# Patient Record
Sex: MALE
[De-identification: names, ages, dates, MRNs, and addresses within clinical notes are randomized per-mention and may not be internally consistent; named-entity substitution may affect disease eponyms.]

---

## 2021-04-08 PROBLEM — Z00.00 ENCOUNTER FOR PREVENTIVE HEALTH EXAMINATION: Status: ACTIVE | Noted: 2021-04-08

## 2021-04-12 ENCOUNTER — APPOINTMENT (OUTPATIENT)
Dept: ORTHOPEDIC SURGERY | Facility: CLINIC | Age: 26
End: 2021-04-12
Payer: COMMERCIAL

## 2021-04-12 VITALS — HEIGHT: 72 IN | WEIGHT: 203 LBS | BODY MASS INDEX: 27.5 KG/M2

## 2021-04-12 VITALS — SYSTOLIC BLOOD PRESSURE: 128 MMHG | HEART RATE: 84 BPM | DIASTOLIC BLOOD PRESSURE: 80 MMHG

## 2021-04-12 DIAGNOSIS — Z78.9 OTHER SPECIFIED HEALTH STATUS: ICD-10-CM

## 2021-04-12 DIAGNOSIS — Z83.3 FAMILY HISTORY OF DIABETES MELLITUS: ICD-10-CM

## 2021-04-12 DIAGNOSIS — M79.604 PAIN IN RIGHT LEG: ICD-10-CM

## 2021-04-12 PROCEDURE — 99203 OFFICE O/P NEW LOW 30 MIN: CPT

## 2021-04-12 PROCEDURE — 73590 X-RAY EXAM OF LOWER LEG: CPT | Mod: RT

## 2021-04-12 PROCEDURE — 99072 ADDL SUPL MATRL&STAF TM PHE: CPT

## 2021-04-14 PROBLEM — M79.604 RIGHT LEG PAIN: Status: ACTIVE | Noted: 2021-04-14

## 2021-04-14 NOTE — PHYSICAL EXAM
[de-identified] : Oriented to time, place, person\par Mood: Normal\par Affect: Normal\par Appearance: Healthy, well appearing, no acute distress.\par Gait: Normal\par Assistive Devices: None\par \par Right Lower Extremity:\par \par Skin: Clean, dry, intact\par Inspection: No obvious malalignment, no masses, no swelling, PES planis deformity. \par Pulses: 2+ DP/PT pulses\par ROM: Full knee ROM, full ankle ROM\par Tenderness: Diffuse ttp to medial tibial border\par Stability: Ankle A/P stable, Knee stable varus/valgus/drawer \par Strength: 5/5 Q/H/TA/GS/EHL, without atrophy\par Neuro: In tact to light touch throughout, DTR's normal [de-identified] : The following radiographs were ordered and read by me during this patients visit. I reviewed each radiograph in detail with the patient and discussed the findings as highlighted below. \par \par 3 views of the right tib/fib were obtained today, 04/12/2021, that show no acute fracture or dislocation. There is no degenerative change seen. There is no gross malalignment. No significant other obvious osseous abnormality, otherwise unremarkable.

## 2021-04-14 NOTE — HISTORY OF PRESENT ILLNESS
[de-identified] : 26 year old male in [employer], hx of flat foot presents today with right lower extremity pain. States that physical activities done at  the [employer] may have cause his lower extremity pain. The pain is constant worse with running and working out. Pain is localized to the shins. He noticed swelling and redness after running a couple of times. He thought it was just shin splints but the pain has become worse. He is not taking pain medication. He reports wearing special soles for his flat foot deformity. \par \par The patient's past medical history, past surgical history, medications and allergies were reviewed by me today with the patient and documented accordingly. In addition, the patient's family and social history, which were noncontributory to this visit, were reviewed also.

## 2021-04-14 NOTE — DISCUSSION/SUMMARY
[de-identified] : 25 y/o male with right shin pain. \par \par The patient presents with an overuse load injury to the right tibia. The patient's symptoms are vague diffuse pain along the right tibia. I discussed the risk factors for this injury includes running on uneven surfaces, poor technique, improper running shoes, increase in activity, and over pronation. The pain is typically caused from a periostitis and inflammatory reaction. The patient's radiographs do not show evidence of stress reaction. Differential diagnosis includes medial tibial stress syndrome, stress fracture, exertional compartment syndrome, tendinopathy, peripheral nerve entrapment, or lumbar radiculopathy. MRI Rx given to patient to further delineate any internal structural derangement to the right tib/fib. This will allow for better understanding of the severity of disease, and allow for better stratification of treatment strategies (surgical vs. non-surgical). Patient is agreeable to further imaging. \par \par Recommendations: Conservative care & observation, this includes rest/activity avoidance until less symptomatic with subsequent gradual return to full activity as tolerated. Patient may also use OTC NSAID's or acetaminophen as tolerated, with application of ice to the area 2-3x daily for 20 minutes after periods of activity. \par \par Followup: After MRI

## 2021-04-14 NOTE — ADDENDUM
[FreeTextEntry1] : This note was written by Viktoriya Pringle on 04/12/2021 acting solely as a scribe for Dr. Ken Aguilar.\par \par All medical record entries made by the Scribe were at my, Dr. Ken Aguilar, direction and personally dictated by me on 04/12/2021. I have personally reviewed the chart and agree that the record accurately reflects my personal performance of the history, physical exam, assessment and plan.

## 2021-04-26 ENCOUNTER — APPOINTMENT (OUTPATIENT)
Dept: MRI IMAGING | Facility: IMAGING CENTER | Age: 26
End: 2021-04-26
Payer: COMMERCIAL

## 2021-04-26 ENCOUNTER — OUTPATIENT (OUTPATIENT)
Dept: OUTPATIENT SERVICES | Facility: HOSPITAL | Age: 26
LOS: 1 days | End: 2021-04-26
Payer: COMMERCIAL

## 2021-04-26 DIAGNOSIS — Z00.00 ENCOUNTER FOR GENERAL ADULT MEDICAL EXAMINATION WITHOUT ABNORMAL FINDINGS: ICD-10-CM

## 2021-04-26 PROCEDURE — 73718 MRI LOWER EXTREMITY W/O DYE: CPT | Mod: 26,RT

## 2021-04-26 PROCEDURE — 73718 MRI LOWER EXTREMITY W/O DYE: CPT

## 2021-04-30 ENCOUNTER — NON-APPOINTMENT (OUTPATIENT)
Age: 26
End: 2021-04-30